# Patient Record
Sex: MALE | Race: WHITE | NOT HISPANIC OR LATINO | Employment: FULL TIME | URBAN - METROPOLITAN AREA
[De-identification: names, ages, dates, MRNs, and addresses within clinical notes are randomized per-mention and may not be internally consistent; named-entity substitution may affect disease eponyms.]

---

## 2024-03-14 ENCOUNTER — LAB (OUTPATIENT)
Dept: LAB | Facility: LAB | Age: 24
End: 2024-03-14
Payer: COMMERCIAL

## 2024-03-14 ENCOUNTER — OFFICE VISIT (OUTPATIENT)
Dept: PRIMARY CARE | Facility: CLINIC | Age: 24
End: 2024-03-14
Payer: COMMERCIAL

## 2024-03-14 VITALS — WEIGHT: 283 LBS | HEART RATE: 79 BPM | OXYGEN SATURATION: 95 % | HEIGHT: 70 IN | BODY MASS INDEX: 40.52 KG/M2

## 2024-03-14 DIAGNOSIS — Z00.00 WELLNESS EXAMINATION: Primary | ICD-10-CM

## 2024-03-14 DIAGNOSIS — R06.83 SNORING: ICD-10-CM

## 2024-03-14 DIAGNOSIS — E66.01 CLASS 3 SEVERE OBESITY DUE TO EXCESS CALORIES WITHOUT SERIOUS COMORBIDITY WITH BODY MASS INDEX (BMI) OF 40.0 TO 44.9 IN ADULT (MULTI): ICD-10-CM

## 2024-03-14 DIAGNOSIS — Z00.00 WELLNESS EXAMINATION: ICD-10-CM

## 2024-03-14 DIAGNOSIS — E55.9 VITAMIN D DEFICIENCY: ICD-10-CM

## 2024-03-14 PROBLEM — E66.813 CLASS 3 SEVERE OBESITY DUE TO EXCESS CALORIES WITHOUT SERIOUS COMORBIDITY WITH BODY MASS INDEX (BMI) OF 40.0 TO 44.9 IN ADULT: Status: ACTIVE | Noted: 2024-03-14

## 2024-03-14 LAB
ALBUMIN SERPL BCP-MCNC: 4.4 G/DL (ref 3.4–5)
ALP SERPL-CCNC: 69 U/L (ref 33–120)
ALT SERPL W P-5'-P-CCNC: 22 U/L (ref 10–52)
ANION GAP SERPL CALC-SCNC: 12 MMOL/L (ref 10–20)
AST SERPL W P-5'-P-CCNC: 16 U/L (ref 9–39)
BILIRUB SERPL-MCNC: 0.6 MG/DL (ref 0–1.2)
BUN SERPL-MCNC: 11 MG/DL (ref 6–23)
CALCIUM SERPL-MCNC: 8.8 MG/DL (ref 8.6–10.3)
CHLORIDE SERPL-SCNC: 106 MMOL/L (ref 98–107)
CHOLEST SERPL-MCNC: 197 MG/DL (ref 0–199)
CHOLESTEROL/HDL RATIO: 5.8
CO2 SERPL-SCNC: 26 MMOL/L (ref 21–32)
CREAT SERPL-MCNC: 0.92 MG/DL (ref 0.5–1.3)
EGFRCR SERPLBLD CKD-EPI 2021: >90 ML/MIN/1.73M*2
ERYTHROCYTE [DISTWIDTH] IN BLOOD BY AUTOMATED COUNT: 14.7 % (ref 11.5–14.5)
GLUCOSE SERPL-MCNC: 94 MG/DL (ref 74–99)
HCT VFR BLD AUTO: 48.6 % (ref 41–52)
HDLC SERPL-MCNC: 33.8 MG/DL
HGB BLD-MCNC: 15.2 G/DL (ref 13.5–17.5)
LDLC SERPL CALC-MCNC: 122 MG/DL
MCH RBC QN AUTO: 25 PG (ref 26–34)
MCHC RBC AUTO-ENTMCNC: 31.3 G/DL (ref 32–36)
MCV RBC AUTO: 80 FL (ref 80–100)
NON HDL CHOLESTEROL: 163 MG/DL (ref 0–149)
NRBC BLD-RTO: 0 /100 WBCS (ref 0–0)
PLATELET # BLD AUTO: 293 X10*3/UL (ref 150–450)
POTASSIUM SERPL-SCNC: 4.1 MMOL/L (ref 3.5–5.3)
PROT SERPL-MCNC: 6.8 G/DL (ref 6.4–8.2)
RBC # BLD AUTO: 6.08 X10*6/UL (ref 4.5–5.9)
SODIUM SERPL-SCNC: 140 MMOL/L (ref 136–145)
TRIGL SERPL-MCNC: 207 MG/DL (ref 0–149)
TSH SERPL-ACNC: 1.29 MIU/L (ref 0.44–3.98)
VLDL: 41 MG/DL (ref 0–40)
WBC # BLD AUTO: 7.9 X10*3/UL (ref 4.4–11.3)

## 2024-03-14 PROCEDURE — 99385 PREV VISIT NEW AGE 18-39: CPT

## 2024-03-14 PROCEDURE — 99213 OFFICE O/P EST LOW 20 MIN: CPT

## 2024-03-14 PROCEDURE — 85027 COMPLETE CBC AUTOMATED: CPT

## 2024-03-14 PROCEDURE — 84443 ASSAY THYROID STIM HORMONE: CPT

## 2024-03-14 PROCEDURE — 1036F TOBACCO NON-USER: CPT

## 2024-03-14 PROCEDURE — 87389 HIV-1 AG W/HIV-1&-2 AB AG IA: CPT

## 2024-03-14 PROCEDURE — 80061 LIPID PANEL: CPT

## 2024-03-14 PROCEDURE — 3008F BODY MASS INDEX DOCD: CPT

## 2024-03-14 PROCEDURE — 86803 HEPATITIS C AB TEST: CPT

## 2024-03-14 PROCEDURE — 80053 COMPREHEN METABOLIC PANEL: CPT

## 2024-03-14 PROCEDURE — 36415 COLL VENOUS BLD VENIPUNCTURE: CPT

## 2024-03-14 PROCEDURE — 82306 VITAMIN D 25 HYDROXY: CPT

## 2024-03-14 ASSESSMENT — PATIENT HEALTH QUESTIONNAIRE - PHQ9
SUM OF ALL RESPONSES TO PHQ9 QUESTIONS 1 AND 2: 0
2. FEELING DOWN, DEPRESSED OR HOPELESS: NOT AT ALL
1. LITTLE INTEREST OR PLEASURE IN DOING THINGS: NOT AT ALL

## 2024-03-14 NOTE — PATIENT INSTRUCTIONS
#BMI 40.6    Aim for 60 gm of Protein daily  Drink 60 oz of Water daily  Exercise 60 min at least 5days a week  There are many options for weight loss:  *not all programs work for all people, the best way to success is to be 100% ready for change and commitment to 1-2 programs at a time.    Look again into [employer] (the promo code on the website is NOOM20) for cognitive behavior therapy.  [employer] is an option for a meal replacement program.   [employer] is a calorie counting jeri that is Free and is successful if used properly.   There are several medications also being used (which all also require dietary changes to work):  phentermine, contrave, Wegovy, Saxenda, etc.   Please check with your insurance provider for coverage of such medications.    - eat off the smaller plate (like the salad plate)  - half the plate should be vegetables, 1/4 protein, 1/4 carbs - for lunch and dinner  -  discussed dietary changes including proper protein intake, increase vegetable intake, fruit intake, low carbohydrate intake, and no processed food intake.  - discussed with patient to stop eating fast food  -  put your fork down between bites  - drink at least 64 oz of water day.  do not consume large amounts of water with your meal  - do not drink sugary drinks such as pop or specialty coffee drinks  -  eat your vegetables and protein first.  Have vegetables at lunch and dinner  - discussed with patient to increase activity 4 days a week preferably 5. Exercise should be done 5 days a week including walking for 20-30 minutes each day.   -  keep log of calorie intake and food intake and bring with you to next appointment.You can use an ejri on your phone such as [employer] pal.  - discussed with patient using activity trackers such as a fit bit. log  activity daily and bring  activity log at next visit  -  follow-up in one month  - increase your protein intake - should have at least 4 servings of protein per day  - decrease carb intake -  discussed carb serving size - limit carb servings to 4 servings a day

## 2024-03-14 NOTE — PROGRESS NOTES
Subjective   Patient ID: Salomon Fenton is a 23 y.o. male who presents for New Patient Visit (NPV to establish new primary/Would like to address acid reflux and possible sleep apnea due to snoring.) and Annual Exam (CPE, annual labs///90).    Pt is here for annual wellness/establish care.  Reports overall health is getting better, trying to learn how to be an adult, college was hard on his diet with out adult, recently started dieting     Do you take any herbs or supplements that were not prescribed by a doctor? no  Are you taking calcium supplements? no  Are you taking aspirin daily? no  Colonoscopy: n/a  Fasting blood work: 3/14/24  Last eye exam: 2022  Last dental Exam: jan 24  Exercise:2-3 days per week doing a routine cardio/small weights 30mins  Mood:pleasant  Sleep: really well  Diet:  portion control  Occupation:  grad student phd In communication, teaching public speaking  Do you have pain that bothers you in your daily life? no    1. Patient Counseling:  --Nutrition: Stressed importance of moderation in sodium/caffeine intake, saturated fat and cholesterol, caloric balance, sufficient intake of fresh fruits, vegetables, fiber, calcium, iron, and 1 mg of folate supplement per day (for females capable of pregnancy).  --Discussed the issue of estrogen replacement, calcium supplement, and the daily use of baby aspirin.  --Exercise: Stressed the importance of regular exercise.   --Substance Abuse: Discussed cessation/primary prevention of tobacco, alcohol, or other drug use; driving or other dangerous activities under the influence; availability of treatment for abuse.    --Sexuality: Discussed sexually transmitted diseases, partner selection, use of condoms, avoidance of unintended pregnancy  and contraceptive alternatives.   --Injury prevention: Discussed safety belts, safety helmets, smoke detector, smoking near bedding or upholstery.   --Dental health: Discussed importance of regular tooth brushing,  "flossing, and dental visits.  --Immunizations reviewed.  --Discussed benefits of screening colonoscopy.  --After hours service discussed with patient  2 Discussed the patient's BMI with her.  The BMI is above average. The patient received Provided instructions on dietary changes  Provided instructions on exercise because they have an above normal BMI.  3 Follow up as needed for acute illness    In a face to face session, I informed patient of his/her BMI > 30. We discussed appropriate nutrition choices and exercise plan to help achieve weight reduction.            Review of Systems    Objective   Pulse 79   Ht 1.778 m (5' 10\")   Wt 128 kg (283 lb)   SpO2 95%   BMI 40.61 kg/m²     Physical Exam    Assessment/Plan   Problem List Items Addressed This Visit    None  Visit Diagnoses         Codes    Wellness examination    -  Primary Z00.00    Relevant Orders    CBC    Comprehensive Metabolic Panel    Lipid Panel    TSH with reflex to Free T4 if abnormal    Hepatitis C Antibody    HIV 1/2 Antigen/Antibody Screen with Reflex to Confirmation    Vitamin D deficiency     E55.9    Relevant Orders    Vitamin D 25-Hydroxy,Total (for eval of Vitamin D levels)          Salomon was seen today for new patient visit and annual exam.  Diagnoses and all orders for this visit:  Wellness examination  -     CBC; Future  -     Comprehensive Metabolic Panel; Future  -     Lipid Panel; Future  -     TSH with reflex to Free T4 if abnormal; Future  -     Hepatitis C Antibody; Future  -     HIV 1/2 Antigen/Antibody Screen with Reflex to Confirmation; Future  Vitamin D deficiency  -     Vitamin D 25-Hydroxy,Total (for eval of Vitamin D levels); Future  Class 3 severe obesity due to excess calories without serious comorbidity with body mass index (BMI) of 40.0 to 44.9 in adult (CMS/[facility])  Comments:  educated on increasing exercise to a min of 45min 5days per week, appropriate calorie counts/portion control  Orders:  -     Home sleep apnea test " (HSAT); Future  Snoring  Comments:  pts roomates complain that he keeps them awake at night with his snoring,  Orders:  -     Home sleep apnea test (HSAT); Future

## 2024-03-15 LAB
25(OH)D3 SERPL-MCNC: 8 NG/ML (ref 30–100)
HCV AB SER QL: NONREACTIVE
HIV 1+2 AB+HIV1 P24 AG SERPL QL IA: NONREACTIVE

## 2025-03-21 ENCOUNTER — APPOINTMENT (OUTPATIENT)
Dept: PRIMARY CARE | Facility: CLINIC | Age: 25
End: 2025-03-21
Payer: COMMERCIAL

## 2025-05-09 ENCOUNTER — APPOINTMENT (OUTPATIENT)
Dept: PRIMARY CARE | Facility: CLINIC | Age: 25
End: 2025-05-09
Payer: COMMERCIAL

## 2025-05-09 VITALS
WEIGHT: 294 LBS | SYSTOLIC BLOOD PRESSURE: 150 MMHG | HEIGHT: 70 IN | DIASTOLIC BLOOD PRESSURE: 90 MMHG | BODY MASS INDEX: 42.09 KG/M2 | OXYGEN SATURATION: 99 % | HEART RATE: 85 BPM

## 2025-05-09 DIAGNOSIS — I10 PRIMARY HYPERTENSION: ICD-10-CM

## 2025-05-09 DIAGNOSIS — K21.9 GASTROESOPHAGEAL REFLUX DISEASE WITHOUT ESOPHAGITIS: ICD-10-CM

## 2025-05-09 DIAGNOSIS — E66.813 CLASS 3 SEVERE OBESITY DUE TO EXCESS CALORIES WITHOUT SERIOUS COMORBIDITY WITH BODY MASS INDEX (BMI) OF 40.0 TO 44.9 IN ADULT: ICD-10-CM

## 2025-05-09 DIAGNOSIS — Z13.0 SCREENING FOR DEFICIENCY ANEMIA: Primary | ICD-10-CM

## 2025-05-09 DIAGNOSIS — Z13.220 LIPID SCREENING: ICD-10-CM

## 2025-05-09 DIAGNOSIS — Z13.29 SCREENING FOR THYROID DISORDER: ICD-10-CM

## 2025-05-09 DIAGNOSIS — Z13.220 SCREENING FOR LIPOID DISORDERS: ICD-10-CM

## 2025-05-09 DIAGNOSIS — Z00.00 WELLNESS EXAMINATION: ICD-10-CM

## 2025-05-09 DIAGNOSIS — N52.9 MALE ERECTILE DISORDER: ICD-10-CM

## 2025-05-09 DIAGNOSIS — Z13.1 DIABETES MELLITUS SCREENING: ICD-10-CM

## 2025-05-09 DIAGNOSIS — E55.9 VITAMIN D DEFICIENCY: ICD-10-CM

## 2025-05-09 PROCEDURE — 99213 OFFICE O/P EST LOW 20 MIN: CPT

## 2025-05-09 PROCEDURE — 1036F TOBACCO NON-USER: CPT

## 2025-05-09 PROCEDURE — 3008F BODY MASS INDEX DOCD: CPT

## 2025-05-09 PROCEDURE — 3080F DIAST BP >= 90 MM HG: CPT

## 2025-05-09 PROCEDURE — 99395 PREV VISIT EST AGE 18-39: CPT

## 2025-05-09 PROCEDURE — 3077F SYST BP >= 140 MM HG: CPT

## 2025-05-09 RX ORDER — LISINOPRIL 10 MG/1
10 TABLET ORAL DAILY
Qty: 100 TABLET | Refills: 3 | Status: SHIPPED | OUTPATIENT
Start: 2025-05-09 | End: 2026-06-13

## 2025-05-09 RX ORDER — CALCIUM CARBONATE 750 MG/1
1 TABLET, CHEWABLE ORAL DAILY
Qty: 1 KIT | Refills: 0 | Status: SHIPPED | OUTPATIENT
Start: 2025-05-09

## 2025-05-09 RX ORDER — OMEPRAZOLE 40 MG/1
40 CAPSULE, DELAYED RELEASE ORAL
Qty: 60 CAPSULE | Refills: 0 | Status: SHIPPED | OUTPATIENT
Start: 2025-05-09 | End: 2025-07-08

## 2025-05-09 RX ORDER — LORATADINE 10 MG
10 TABLET,DISINTEGRATING ORAL DAILY
COMMUNITY

## 2025-05-09 ASSESSMENT — ENCOUNTER SYMPTOMS
STRIDOR: 0
BELCHING: 1
COUGH: 0
HOARSE VOICE: 0
CHOKING: 0
EARLY SATIETY: 0
NERVOUS/ANXIOUS: 0
SORE THROAT: 0
ORTHOPNEA: 0
GLOBUS SENSATION: 0
WATER BRASH: 0
HEARTBURN: 0
DECREASED LIBIDO: 0

## 2025-05-09 NOTE — PATIENT INSTRUCTIONS
Please reduce the amount of sodium in your diet (avoid canned soups, pretzels, nuts, popcorn, ketchup/soy sause/etc., and other high-sodium foods)Get 30 minutes of aerobic exercise most days of the week (such as walking, swimming, riding a bike, etc.)Work on reaching an ideal body weight which will improve the progression or even eliminate your hypertension.Be aware of signs of stroke (which is increased in patients with extremely high blood pressure) such as facial droop, weakness on one side of the body, or slurred speech. Smoking, caffeine, alcohol, stress and some medications may make your blood pressure worse. Please try to eliminate these.    Borderline Hypertension:   · In November 2018, the American College of Cardiology and American Heart Association issued new guidelines that redefined high blood. Goal is now less than 120/70. Stage 1 high blood pressure (a diagnosis of hypertension) is now between 130 and 139 systolic or between 80 and 89 diastolic (the bottom number). Stage 2 high blood pressure is now over 140 systolic or 90 diastolic. Your reading today was in the Stage 2 range, we will have to monitor this closely to protect your brain, eyes, heart and kidneys.   · Stress can play a large roll in elevated blood pressure. Please review the 4-7-8 breath work exercise and try to perform several times per day and as needed for times of stress. You can also go to YouTube and practice this breathing exercise with Dr. Weil.   · It would also be helpful if you purchased a blood pressure cuff for you home to keep a log and bring the machine or pictures of the readings from the machines screen to you next visit.   · Magnesium 400 mg daily has shown some benefit in blood pressure reduction, as well as improves some types of chronic pain. Please titrate slowly to try to reduce loose stools.

## 2025-05-09 NOTE — PROGRESS NOTES
Subjective   Patient ID: Salomon Fenton is a 24 y.o. male who presents for Annual Exam (Annual CPE/143/100).    Pt is here for annual wellness.  Reports overall health is good    Do you take any herbs or supplements that were not prescribed by a doctor? no  Are you taking calcium supplements? no  Are you taking aspirin daily? no  Colonoscopy: n/a  Fasting blood work: n/a  Last eye exam: 1 year ago  Last dental Exam: 8 months ago  Exercise:walks  Mood:pleasant  Sleep: good  Diet:  well roounded  Occupation:   grad student phd In communication, teaching public speaking  Do you have pain that bothers you in your daily life? no    1. Patient Counseling:  --Nutrition: Stressed importance of moderation in sodium/caffeine intake, saturated fat and cholesterol, caloric balance, sufficient intake of fresh fruits, vegetables, fiber, calcium, iron, and 1 mg of folate supplement per day (for females capable of pregnancy).  --Discussed the issue of estrogen replacement, calcium supplement, and the daily use of baby aspirin as appropriate per pt.  --Exercise: Stressed the importance of regular exercise.   --Substance Abuse: Discussed cessation/primary prevention of tobacco, alcohol, or other drug use; driving or other dangerous activities under the influence; availability of treatment for abuse.    --Sexuality: Discussed sexually transmitted diseases, partner selection, use of condoms, avoidance of unintended pregnancy  and contraceptive alternatives.   --Injury prevention: Discussed safety belts, safety helmets, smoke detector, smoking near bedding or upholstery.   --Dental health: Discussed importance of regular tooth brushing, flossing, and dental visits.  --Immunizations reviewed.  --Discussed benefits of colon cancer screening.  --After hours service discussed with patient  2 Discussed the patient's BMI.  The BMI is above average. The patient received Provided instructions on dietary changes  Provided instructions on  "exercise because they have an above normal BMI.  3 Follow up as needed for acute illness        GERD  He complains of belching. He reports no choking, no coughing, no early satiety, no globus sensation, no heartburn, no hoarse voice, no sore throat, no stridor, no tooth decay or no water brash. This is a recurrent problem. The current episode started more than 1 month ago. The problem occurs frequently. The problem has been waxing and waning. The symptoms are aggravated by certain foods. Pertinent negatives include no anemia, melena or orthopnea. Risk factors include obesity, lack of exercise and caffeine use. He has tried an antacid for the symptoms. The treatment provided moderate relief.   Erectile Dysfunction  This is a new problem. The current episode started more than 1 month ago. The problem is unchanged. The nature of his difficulty is maintaining erection. He reports no anxiety, decreased libido or performance anxiety.        Review of Systems   HENT:  Negative for hoarse voice and sore throat.    Respiratory:  Negative for cough and choking.    Gastrointestinal:  Negative for heartburn and melena.   Genitourinary:  Negative for decreased libido.   Psychiatric/Behavioral:  The patient is not nervous/anxious.        Objective   Pulse 85   Ht 1.778 m (5' 10\")   Wt 133 kg (294 lb)   SpO2 99%   BMI 42.18 kg/m²     Physical Exam  Constitutional:       Appearance: Normal appearance. He is obese.   HENT:      Head: Normocephalic and atraumatic.      Right Ear: Tympanic membrane and external ear normal.      Left Ear: Tympanic membrane and external ear normal.      Nose: Nose normal.      Mouth/Throat:      Mouth: Mucous membranes are moist.      Pharynx: Oropharynx is clear. Uvula midline.   Eyes:      General: Lids are normal.      Extraocular Movements: Extraocular movements intact.      Pupils: Pupils are equal, round, and reactive to light.   Neck:      Thyroid: No thyromegaly or thyroid tenderness. "   Cardiovascular:      Rate and Rhythm: Normal rate and regular rhythm.      Heart sounds: Normal heart sounds.   Pulmonary:      Effort: Pulmonary effort is normal.      Breath sounds: Normal breath sounds.   Abdominal:      General: Bowel sounds are normal.      Palpations: Abdomen is soft.      Tenderness: There is no abdominal tenderness. There is no guarding.   Musculoskeletal:         General: No swelling. Normal range of motion.      Cervical back: Normal range of motion.      Right lower leg: No edema.      Left lower leg: No edema.   Lymphadenopathy:      Head:      Right side of head: No submental, submandibular or tonsillar adenopathy.      Left side of head: No submental, submandibular or tonsillar adenopathy.      Cervical: No cervical adenopathy.   Skin:     General: Skin is warm and dry.      Capillary Refill: Capillary refill takes less than 2 seconds.      Coloration: Skin is not jaundiced.      Findings: No lesion or rash.   Neurological:      General: No focal deficit present.      Mental Status: He is alert and oriented to person, place, and time.   Psychiatric:         Mood and Affect: Mood normal.         Behavior: Behavior normal.         Thought Content: Thought content normal.         Judgment: Judgment normal.         Assessment/Plan   Salomon was seen today for annual exam.  Diagnoses and all orders for this visit:  Screening for deficiency anemia  -     CBC; Future  -     CBC  Diabetes mellitus screening  -     Comprehensive Metabolic Panel; Future  -     Comprehensive Metabolic Panel  Screening for thyroid disorder  -     TSH with reflex to Free T4 if abnormal; Future  -     TSH with reflex to Free T4 if abnormal  Lipid screening  -     Lipid Panel; Future  -     Lipid Panel  Screening for lipoid disorders  -     Lipid Panel; Future  -     Lipid Panel  Vitamin D deficiency  -     Vitamin D 25-Hydroxy,Total (for eval of Vitamin D levels); Future  -     Vitamin D 25-Hydroxy,Total (for eval of  Vitamin D levels)  Wellness examination  -     CBC; Future  -     Comprehensive Metabolic Panel; Future  -     TSH with reflex to Free T4 if abnormal; Future  -     Lipid Panel; Future  -     Vitamin D 25-Hydroxy,Total (for eval of Vitamin D levels); Future  -     CBC  -     Comprehensive Metabolic Panel  -     TSH with reflex to Free T4 if abnormal  -     Lipid Panel  -     Vitamin D 25-Hydroxy,Total (for eval of Vitamin D levels)  Primary hypertension  -     CBC; Future  -     Comprehensive Metabolic Panel; Future  -     TSH with reflex to Free T4 if abnormal; Future  -     Lipid Panel; Future  -     Vitamin D 25-Hydroxy,Total (for eval of Vitamin D levels); Future  -     CBC  -     Comprehensive Metabolic Panel  -     TSH with reflex to Free T4 if abnormal  -     Lipid Panel  -     Vitamin D 25-Hydroxy,Total (for eval of Vitamin D levels)  -     lisinopril 10 mg tablet; Take 1 tablet (10 mg) by mouth once daily.  -     blood pressure test kit-medium kit; Use to test once daily.  Male erectile disorder  -     Testosterone,Free and Total; Future  -     Testosterone,Free and Total  Class 3 severe obesity due to excess calories without serious comorbidity with body mass index (BMI) of 40.0 to 44.9 in adult  Gastroesophageal reflux disease without esophagitis  -     omeprazole (PriLOSEC) 40 mg DR capsule; Take 1 capsule (40 mg) by mouth once daily in the morning. Take before meals. Do not crush or chew.

## 2025-05-10 LAB
25(OH)D3+25(OH)D2 SERPL-MCNC: 18 NG/ML (ref 30–100)
ALBUMIN SERPL-MCNC: 4.6 G/DL (ref 3.6–5.1)
ALP SERPL-CCNC: 66 U/L (ref 36–130)
ALT SERPL-CCNC: 27 U/L (ref 9–46)
ANION GAP SERPL CALCULATED.4IONS-SCNC: 11 MMOL/L (CALC) (ref 7–17)
AST SERPL-CCNC: 20 U/L (ref 10–40)
BILIRUB SERPL-MCNC: 0.6 MG/DL (ref 0.2–1.2)
BUN SERPL-MCNC: 12 MG/DL (ref 7–25)
CALCIUM SERPL-MCNC: 9.5 MG/DL (ref 8.6–10.3)
CHLORIDE SERPL-SCNC: 108 MMOL/L (ref 98–110)
CHOLEST SERPL-MCNC: 208 MG/DL
CHOLEST/HDLC SERPL: 5.5 (CALC)
CO2 SERPL-SCNC: 23 MMOL/L (ref 20–32)
CREAT SERPL-MCNC: 0.95 MG/DL (ref 0.6–1.24)
EGFRCR SERPLBLD CKD-EPI 2021: 115 ML/MIN/1.73M2
ERYTHROCYTE [DISTWIDTH] IN BLOOD BY AUTOMATED COUNT: 14 % (ref 11–15)
GLUCOSE SERPL-MCNC: 83 MG/DL (ref 65–99)
HCT VFR BLD AUTO: 46.2 % (ref 38.5–50)
HDLC SERPL-MCNC: 38 MG/DL
HGB BLD-MCNC: 14.7 G/DL (ref 13.2–17.1)
LDLC SERPL CALC-MCNC: 136 MG/DL (CALC)
MCH RBC QN AUTO: 25 PG (ref 27–33)
MCHC RBC AUTO-ENTMCNC: 31.8 G/DL (ref 32–36)
MCV RBC AUTO: 78.7 FL (ref 80–100)
NONHDLC SERPL-MCNC: 170 MG/DL (CALC)
PLATELET # BLD AUTO: 317 THOUSAND/UL (ref 140–400)
PMV BLD REES-ECKER: 10.6 FL (ref 7.5–12.5)
POTASSIUM SERPL-SCNC: 4.4 MMOL/L (ref 3.5–5.3)
PROT SERPL-MCNC: 7.5 G/DL (ref 6.1–8.1)
RBC # BLD AUTO: 5.87 MILLION/UL (ref 4.2–5.8)
SODIUM SERPL-SCNC: 142 MMOL/L (ref 135–146)
TESTOST FREE SERPL-MCNC: NORMAL PG/ML
TESTOST SERPL-MCNC: NORMAL NG/DL
TRIGL SERPL-MCNC: 195 MG/DL
TSH SERPL-ACNC: 1.71 MIU/L (ref 0.4–4.5)
WBC # BLD AUTO: 8.9 THOUSAND/UL (ref 3.8–10.8)

## 2025-05-16 LAB
25(OH)D3+25(OH)D2 SERPL-MCNC: 18 NG/ML (ref 30–100)
ALBUMIN SERPL-MCNC: 4.6 G/DL (ref 3.6–5.1)
ALP SERPL-CCNC: 66 U/L (ref 36–130)
ALT SERPL-CCNC: 27 U/L (ref 9–46)
ANION GAP SERPL CALCULATED.4IONS-SCNC: 11 MMOL/L (CALC) (ref 7–17)
AST SERPL-CCNC: 20 U/L (ref 10–40)
BILIRUB SERPL-MCNC: 0.6 MG/DL (ref 0.2–1.2)
BUN SERPL-MCNC: 12 MG/DL (ref 7–25)
CALCIUM SERPL-MCNC: 9.5 MG/DL (ref 8.6–10.3)
CHLORIDE SERPL-SCNC: 108 MMOL/L (ref 98–110)
CHOLEST SERPL-MCNC: 208 MG/DL
CHOLEST/HDLC SERPL: 5.5 (CALC)
CO2 SERPL-SCNC: 23 MMOL/L (ref 20–32)
CREAT SERPL-MCNC: 0.95 MG/DL (ref 0.6–1.24)
EGFRCR SERPLBLD CKD-EPI 2021: 115 ML/MIN/1.73M2
ERYTHROCYTE [DISTWIDTH] IN BLOOD BY AUTOMATED COUNT: 14 % (ref 11–15)
GLUCOSE SERPL-MCNC: 83 MG/DL (ref 65–99)
HCT VFR BLD AUTO: 46.2 % (ref 38.5–50)
HDLC SERPL-MCNC: 38 MG/DL
HGB BLD-MCNC: 14.7 G/DL (ref 13.2–17.1)
LDLC SERPL CALC-MCNC: 136 MG/DL (CALC)
MCH RBC QN AUTO: 25 PG (ref 27–33)
MCHC RBC AUTO-ENTMCNC: 31.8 G/DL (ref 32–36)
MCV RBC AUTO: 78.7 FL (ref 80–100)
NONHDLC SERPL-MCNC: 170 MG/DL (CALC)
PLATELET # BLD AUTO: 317 THOUSAND/UL (ref 140–400)
PMV BLD REES-ECKER: 10.6 FL (ref 7.5–12.5)
POTASSIUM SERPL-SCNC: 4.4 MMOL/L (ref 3.5–5.3)
PROT SERPL-MCNC: 7.5 G/DL (ref 6.1–8.1)
RBC # BLD AUTO: 5.87 MILLION/UL (ref 4.2–5.8)
SODIUM SERPL-SCNC: 142 MMOL/L (ref 135–146)
TESTOST FREE SERPL-MCNC: 78.2 PG/ML (ref 35–155)
TESTOST SERPL-MCNC: 641 NG/DL (ref 250–1100)
TRIGL SERPL-MCNC: 195 MG/DL
TSH SERPL-ACNC: 1.71 MIU/L (ref 0.4–4.5)
WBC # BLD AUTO: 8.9 THOUSAND/UL (ref 3.8–10.8)